# Patient Record
Sex: MALE | Race: BLACK OR AFRICAN AMERICAN | NOT HISPANIC OR LATINO
[De-identification: names, ages, dates, MRNs, and addresses within clinical notes are randomized per-mention and may not be internally consistent; named-entity substitution may affect disease eponyms.]

---

## 2020-01-30 ENCOUNTER — APPOINTMENT (OUTPATIENT)
Dept: CARDIOLOGY | Facility: CLINIC | Age: 50
End: 2020-01-30

## 2020-06-04 ENCOUNTER — OUTPATIENT (OUTPATIENT)
Dept: OUTPATIENT SERVICES | Facility: HOSPITAL | Age: 50
LOS: 1 days | Discharge: HOME | End: 2020-06-04
Payer: MEDICAID

## 2020-06-04 ENCOUNTER — RESULT REVIEW (OUTPATIENT)
Age: 50
End: 2020-06-04

## 2020-06-04 DIAGNOSIS — N18.2 CHRONIC KIDNEY DISEASE, STAGE 2 (MILD): ICD-10-CM

## 2020-06-04 PROBLEM — Z00.00 ENCOUNTER FOR PREVENTIVE HEALTH EXAMINATION: Noted: 2020-06-04

## 2020-06-04 PROCEDURE — 76770 US EXAM ABDO BACK WALL COMP: CPT | Mod: 26

## 2020-06-25 ENCOUNTER — LABORATORY RESULT (OUTPATIENT)
Age: 50
End: 2020-06-25

## 2020-06-26 ENCOUNTER — APPOINTMENT (OUTPATIENT)
Dept: UROLOGY | Facility: CLINIC | Age: 50
End: 2020-06-26
Payer: MEDICAID

## 2020-06-26 VITALS
SYSTOLIC BLOOD PRESSURE: 130 MMHG | TEMPERATURE: 98.8 F | WEIGHT: 199.4 LBS | BODY MASS INDEX: 31.3 KG/M2 | HEART RATE: 90 BPM | HEIGHT: 67 IN | DIASTOLIC BLOOD PRESSURE: 81 MMHG

## 2020-06-26 DIAGNOSIS — Z78.9 OTHER SPECIFIED HEALTH STATUS: ICD-10-CM

## 2020-06-26 DIAGNOSIS — Z81.8 FAMILY HISTORY OF OTHER MENTAL AND BEHAVIORAL DISORDERS: ICD-10-CM

## 2020-06-26 PROCEDURE — 99203 OFFICE O/P NEW LOW 30 MIN: CPT

## 2020-06-26 RX ORDER — AMLODIPINE BESYLATE 5 MG/1
5 TABLET ORAL
Refills: 0 | Status: ACTIVE | COMMUNITY

## 2020-06-26 RX ORDER — EMTRICITABINE, RILPIVIRINE HYDROCHLORIDE, AND TENOFOVIR ALAFENAMIDE 200; 25; 25 MG/1; MG/1; MG/1
200-25-25 TABLET ORAL
Refills: 0 | Status: ACTIVE | COMMUNITY

## 2020-06-26 RX ORDER — ATORVASTATIN CALCIUM 20 MG/1
20 TABLET, FILM COATED ORAL
Refills: 0 | Status: ACTIVE | COMMUNITY

## 2020-06-26 NOTE — LETTER HEADER
[FreeTextEntry3] : Katarina Fair M.D.\par Director of Urology\par [facility]\par [address] [place] [address], [address]\[place] [address]

## 2020-06-26 NOTE — PHYSICAL EXAM
[General Appearance - Well Developed] : well developed [Normal Appearance] : normal appearance [General Appearance - Well Nourished] : well nourished [General Appearance - In No Acute Distress] : no acute distress [Well Groomed] : well groomed [Heart Rate And Rhythm] : Heart rate and rhythm were normal [Respiration, Rhythm And Depth] : normal respiratory rhythm and effort [Exaggerated Use Of Accessory Muscles For Inspiration] : no accessory muscle use [Auscultation Breath Sounds / Voice Sounds] : lungs were clear to auscultation bilaterally [Abdomen Soft] : soft [Abdomen Tenderness] : non-tender [Abdomen Hernia] : no hernia was discovered [Costovertebral Angle Tenderness] : no ~M costovertebral angle tenderness [Urethral Meatus] : meatus normal [Penis Abnormality] : normal circumcised penis [Scrotum] : the scrotum was normal [Testes Mass (___cm)] : there were no testicular masses [Epididymis] : the epididymides were normal [Testes Tenderness] : no tenderness of the testes [Anus Abnormality] : the anus and perineum were normal [Prostate Enlargement] : the prostate was not enlarged [Rectal Exam - Rectum] : digital rectal exam was normal [Prostate Tenderness] : the prostate was not tender [Prostate Size ___ gm] : prostate size [unfilled] gm [Normal Station and Gait] : the gait and station were normal for the patient's age [FreeTextEntry1] : DTR's & BC reflexes were intact  [No Focal Deficits] : no focal deficits [] : no rash [Mood] : the mood was normal [Affect] : the affect was normal [Oriented To Time, Place, And Person] : oriented to person, place, and time [Not Anxious] : not anxious

## 2020-06-26 NOTE — HISTORY OF PRESENT ILLNESS
[FreeTextEntry1] : MR. Downey is a 50-year-old male who wasn’t sure why he was here other than he has moderate voiding symptoms with elevated creatinine. However when I looked through the papers I found that he also has micro hematuria. He told me that he has had this in the past three years ago he so a Dr. Brewer (he thinks that was the name) in [place] who did an entire workup including a CT urogram and cystoscopy and found nothing. He does not have the will try get me the record. His creatinine was not be going up laboratory studies show micro hematuria he has voiding dysfunction with a slow stream, straining, urgency, frequency incomplete emptying so the nephrologist thought he should see a urologist that is why he is here. He said the voiding symptoms for several years does not know what it might be due to.\par \par Please note he has been HIV-positive for many years does not know where he got up but he is bisexual and acknowledges that he did not always use appropriate protection. His PSA on November 22, 2019 was 1.1, the urinalysis from that day showed 11 to 30 red cells and he has had other studies with similar findings.\par For his voiding issues the other urologist gave him Flomax, that resulted in a lack ejaculation he stopped using it. He was put on oxybutynin help the urge but since then has found his ability to avoid compromised and his feeling of incomplete emptying has gotten worse. Question is what can we do for him.\par  [Urinary Urgency] : urinary urgency [Urinary Frequency] : urinary frequency [Nocturia] : nocturia [Weak Stream] : weak stream [Straining] : straining [Dysuria] : no dysuria [Hematuria - Gross] : no gross hematuria [Hematuria - Microscopic] : no microscopic hematuria

## 2020-06-26 NOTE — ASSESSMENT
[FreeTextEntry1] : His physical exam showed a relatively small prostate with normal BC reflex there is no signs of a hernia and his testicles are on the smaller and softer side.\par \par We’re going to get urine to make sure there’s no signs of infection or cancer, he will get me the records from the previous Dr. so we do not reinvent the wheel, he will keep a record of his intake and output and when he comes in we will review the issues and consider a flow study.\par

## 2020-06-28 LAB
APPEARANCE: CLEAR
BACTERIA UR CULT: NORMAL
BILIRUBIN URINE: NEGATIVE
BLOOD URINE: NEGATIVE
COLOR: ABNORMAL
GLUCOSE QUALITATIVE U: NEGATIVE
KETONES URINE: NEGATIVE
LEUKOCYTE ESTERASE URINE: NEGATIVE
NITRITE URINE: NEGATIVE
PH URINE: 6
PROTEIN URINE: NORMAL
SPECIFIC GRAVITY URINE: 1.03
UROBILINOGEN URINE: NORMAL

## 2020-06-29 LAB — URINE CYTOLOGY: NORMAL

## 2020-07-27 ENCOUNTER — OUTPATIENT (OUTPATIENT)
Dept: OUTPATIENT SERVICES | Facility: HOSPITAL | Age: 50
LOS: 1 days | Discharge: HOME | End: 2020-07-27
Payer: COMMERCIAL

## 2020-07-27 PROCEDURE — 92134 CPTRZ OPH DX IMG PST SGM RTA: CPT | Mod: 26

## 2020-07-27 PROCEDURE — 92202 OPSCPY EXTND ON/MAC DRAW: CPT

## 2020-07-27 PROCEDURE — 92004 COMPRE OPH EXAM NEW PT 1/>: CPT

## 2020-08-13 ENCOUNTER — APPOINTMENT (OUTPATIENT)
Dept: UROLOGY | Facility: CLINIC | Age: 50
End: 2020-08-13
Payer: MEDICAID

## 2020-08-13 VITALS — HEIGHT: 67 IN | TEMPERATURE: 97.7 F | BODY MASS INDEX: 30.61 KG/M2 | WEIGHT: 195 LBS

## 2020-08-13 DIAGNOSIS — R79.89 OTHER SPECIFIED ABNORMAL FINDINGS OF BLOOD CHEMISTRY: ICD-10-CM

## 2020-08-13 PROCEDURE — 99213 OFFICE O/P EST LOW 20 MIN: CPT | Mod: 25

## 2020-08-13 PROCEDURE — 51741 ELECTRO-UROFLOWMETRY FIRST: CPT

## 2020-08-13 PROCEDURE — 51798 US URINE CAPACITY MEASURE: CPT

## 2020-08-13 RX ORDER — OXYBUTYNIN CHLORIDE 2.5 MG/1
TABLET ORAL
Refills: 0 | Status: COMPLETED | COMMUNITY
End: 2020-08-13

## 2020-08-13 NOTE — LETTER BODY
[Dear  ___] : Dear  [unfilled], [Please see my note below.] : Please see my note below. [Sincerely,] : Sincerely, [Courtesy Letter:] : I had the pleasure of seeing your patient, [unfilled], in my office today. [FreeTextEntry2] : Meli Wharton MD\par [address]\[place] [address]

## 2020-08-13 NOTE — LETTER HEADER
[FreeTextEntry3] : Katarina Fair M.D.\par Director of Urology\par [facility]\par [address] [place] [address], [address]\[place] [address]

## 2020-08-13 NOTE — PHYSICAL EXAM
[General Appearance - Well Developed] : well developed [General Appearance - Well Nourished] : well nourished [Normal Appearance] : normal appearance [Well Groomed] : well groomed [General Appearance - In No Acute Distress] : no acute distress [Respiration, Rhythm And Depth] : normal respiratory rhythm and effort [] : no respiratory distress [Edema] : no peripheral edema [Exaggerated Use Of Accessory Muscles For Inspiration] : no accessory muscle use [Mood] : the mood was normal [Affect] : the affect was normal [Oriented To Time, Place, And Person] : oriented to person, place, and time [Not Anxious] : not anxious [No Focal Deficits] : no focal deficits [Normal Station and Gait] : the gait and station were normal for the patient's age

## 2020-08-13 NOTE — ASSESSMENT
[FreeTextEntry1] : His uroflow study shows staccato voiding pattern with pushing/straining to void with elevated postvoid residual 166 cc. He is currently off of oxybutynin as well as tamsulosin/finasteride. His ultrasound showed a 34 g prostate. \par He wanted to know if he can restart the oxybutynin as the note said he has no glaucoma but given his residual in the amount of Valsalva I don't think paralyzing his bladder further at this point until we have some greater degree of diagnosis is a good idea.\par \par We discussed the options and is electing to begin alfuzosin p.o. q. daily. This is an alpha blocker some of the Flomax but should not cause absence of ejaculation. I did caution him that it can cause orthostatic hypotension and he is 50 years old. When he gets out of bed he will need to wait a minute or so to make sure he's not getting dizzy and if it becomes too much of a problem he will have to stop. The usage, dosage, mechanism of action, and adverse events were reviewed. He will keep a voiding diary before he starts This new medication and Repeat the record 6 weeks after he Starts it and then followup for Review and a possible Repeat uroflow study.\par \par Concerning his microscopic hematuria, he had a full workup and 2017, which was negative. We'll continue to keep an eye on this and he understands he may need further intervention in the future.

## 2020-08-13 NOTE — HISTORY OF PRESENT ILLNESS
[Urinary Frequency] : urinary frequency [Urinary Urgency] : urinary urgency [Nocturia] : nocturia [Straining] : straining [Weak Stream] : weak stream [FreeTextEntry1] : Ivonne is a 50-year-old male, with history of HIV, who we have been following for bothersome lower urinary tract symptoms and microscopic hematuria.\par \par He Told us last time that in the past,  in late 2017, he underwent a complete microscopic hematuria workup, including CT scan and cystoscopy. CT scan was negative a cystoscopy was unremarkable as per prior medical records.\par \par Additionally at that time he had possible urinary tract symptoms and was treated with tamsulosin and finasteride. He Did not continue tamsulosin secondary to retrograde ejaculation and discontinue finasteride sometime in the past. He remained on oxybutynin until his last visit where he discontinued As we found out he had not been tested for narrow angle glaucoma ever and we need to make sure he did not have that before continuing to take anticholinergic medication..\par \par He presents at to Review his UA C&S, He was supposed to bring in a record of his intake and output and did not, and renal/bladder ultrasound And depending on the results we would consider a flow study [Dysuria] : no dysuria [Hematuria - Gross] : no gross hematuria [Hematuria - Microscopic] : no microscopic hematuria

## 2020-10-15 ENCOUNTER — APPOINTMENT (OUTPATIENT)
Dept: UROLOGY | Facility: CLINIC | Age: 50
End: 2020-10-15

## 2020-12-03 ENCOUNTER — APPOINTMENT (OUTPATIENT)
Dept: UROLOGY | Facility: CLINIC | Age: 50
End: 2020-12-03
Payer: MEDICAID

## 2020-12-03 VITALS
BODY MASS INDEX: 32.02 KG/M2 | TEMPERATURE: 98 F | SYSTOLIC BLOOD PRESSURE: 133 MMHG | WEIGHT: 204 LBS | HEART RATE: 81 BPM | HEIGHT: 67 IN | DIASTOLIC BLOOD PRESSURE: 88 MMHG

## 2020-12-03 PROCEDURE — 99213 OFFICE O/P EST LOW 20 MIN: CPT

## 2020-12-03 PROCEDURE — 51741 ELECTRO-UROFLOWMETRY FIRST: CPT

## 2020-12-03 PROCEDURE — 51798 US URINE CAPACITY MEASURE: CPT

## 2020-12-03 PROCEDURE — 99072 ADDL SUPL MATRL&STAF TM PHE: CPT

## 2020-12-03 NOTE — LETTER HEADER
[FreeTextEntry3] : Katarina Fair M.D.\par Director of Urology\par [facility]\par [address] [place] [address], [address]\[place] [address]

## 2020-12-03 NOTE — HISTORY OF PRESENT ILLNESS
[Urinary Urgency] : urinary urgency [Urinary Frequency] : urinary frequency [Nocturia] : nocturia [Straining] : straining [Weak Stream] : weak stream [FreeTextEntry1] : Ivonne is a 50-year-old male, with history of HIV, who we have been following for bothersome lower urinary tract symptoms and microscopic hematuria.\par \par At his last visit he had discontinued tamsulosin secondary to bothersome retrograde ejaculation and we started him instead on alfuzosin p.o. q. daily.\par \par He reports some subjective improvement however, he it is still experiencing small-volume voiding with frequency and some urgency.\par \par He presents today to review his voiding diary and possibly get a repeat uroflow study on the Uroxatral.\par  [Dysuria] : no dysuria [Hematuria - Gross] : no gross hematuria [Hematuria - Microscopic] : no microscopic hematuria

## 2020-12-03 NOTE — LETTER BODY
[Dear  ___] : Dear  [unfilled], [Courtesy Letter:] : I had the pleasure of seeing your patient, [unfilled], in my office today. [Please see my note below.] : Please see my note below. [Sincerely,] : Sincerely, [FreeTextEntry2] : Meli Wharton MD\par [address]\[place] [address]

## 2020-12-03 NOTE — ASSESSMENT
[FreeTextEntry1] : We reviewed his voiding diary, uroflow study, and symptom index. He still having breakthrough symptoms. His uroflow study, on alfuzosin, is not much different than his prior uroflow study off of medication. It seems he may be having some irritative voiding. We'll have him cleared by ophthalmology to start anticholinergic therapy. He told us the doctor said they sent it in but we never got it. Once he starts the medication he will then keep a voiding diary in 6 weeks And followup after for Review and a possible repeat uroflow. If this is not diagnostic we may need formal urodynamics

## 2020-12-10 RX ORDER — OXYBUTYNIN CHLORIDE 5 MG/1
5 TABLET, EXTENDED RELEASE ORAL
Qty: 30 | Refills: 3 | Status: DISCONTINUED | COMMUNITY
Start: 2020-12-10 | End: 2020-12-10

## 2021-01-22 ENCOUNTER — APPOINTMENT (OUTPATIENT)
Dept: UROLOGY | Facility: CLINIC | Age: 51
End: 2021-01-22

## 2021-03-05 ENCOUNTER — APPOINTMENT (OUTPATIENT)
Dept: GASTROENTEROLOGY | Facility: CLINIC | Age: 51
End: 2021-03-05
Payer: MEDICAID

## 2021-03-05 ENCOUNTER — OUTPATIENT (OUTPATIENT)
Dept: OUTPATIENT SERVICES | Facility: HOSPITAL | Age: 51
LOS: 1 days | Discharge: HOME | End: 2021-03-05

## 2021-03-05 ENCOUNTER — OUTPATIENT (OUTPATIENT)
Dept: OUTPATIENT SERVICES | Facility: HOSPITAL | Age: 51
LOS: 1 days | Discharge: HOME | End: 2021-03-05
Payer: MEDICAID

## 2021-03-05 VITALS
DIASTOLIC BLOOD PRESSURE: 87 MMHG | HEART RATE: 77 BPM | BODY MASS INDEX: 30.61 KG/M2 | TEMPERATURE: 97.2 F | OXYGEN SATURATION: 96 % | HEIGHT: 67 IN | WEIGHT: 195 LBS | SYSTOLIC BLOOD PRESSURE: 132 MMHG

## 2021-03-05 DIAGNOSIS — I10 ESSENTIAL (PRIMARY) HYPERTENSION: ICD-10-CM

## 2021-03-05 DIAGNOSIS — Z00.00 ENCOUNTER FOR GENERAL ADULT MEDICAL EXAMINATION W/OUT ABNORMAL FINDINGS: ICD-10-CM

## 2021-03-05 DIAGNOSIS — Z21 ASYMPTOMATIC HUMAN IMMUNODEFICIENCY VIRUS [HIV] INFECTION STATUS: ICD-10-CM

## 2021-03-05 DIAGNOSIS — R05 COUGH: ICD-10-CM

## 2021-03-05 PROCEDURE — 99203 OFFICE O/P NEW LOW 30 MIN: CPT

## 2021-03-05 PROCEDURE — 71046 X-RAY EXAM CHEST 2 VIEWS: CPT | Mod: 26

## 2021-03-05 NOTE — PHYSICAL EXAM
[General Appearance - Alert] : alert [General Appearance - In No Acute Distress] : in no acute distress [General Appearance - Well Nourished] : well nourished [General Appearance - Well Developed] : well developed [Sclera] : the sclera and conjunctiva were normal [PERRL With Normal Accommodation] : pupils were equal in size, round, and reactive to light [Extraocular Movements] : extraocular movements were intact [Outer Ear] : the ears and nose were normal in appearance [Neck Appearance] : the appearance of the neck was normal [Respiration, Rhythm And Depth] : normal respiratory rhythm and effort [Exaggerated Use Of Accessory Muscles For Inspiration] : no accessory muscle use [Auscultation Breath Sounds / Voice Sounds] : lungs were clear to auscultation bilaterally [Chest Palpation] : palpation of the chest revealed no abnormalities [Lungs Percussion] : the lungs were normal to percussion [Apical Impulse] : the apical impulse was normal [Heart Sounds] : normal S1 and S2 [Heart Sounds Gallop] : no gallops [Murmurs] : no murmurs [Bowel Sounds] : normal bowel sounds [Abdomen Soft] : soft [Abdomen Tenderness] : non-tender [] : no hepato-splenomegaly [Abdomen Mass (___ Cm)] : no abdominal mass palpated [Abdomen Hernia] : no hernia was discovered [Abnormal Walk] : normal gait [Skin Color & Pigmentation] : normal skin color and pigmentation

## 2021-03-05 NOTE — REVIEW OF SYSTEMS
[Recent Weight Gain (___ Lbs)] : recent [unfilled] ~Ulb weight gain [Fever] : no fever [Chills] : no chills [Feeling Poorly] : not feeling poorly [Feeling Tired] : not feeling tired [Eye Pain] : no eye pain [Red Eyes] : eyes not red [Earache] : no earache [Heart Rate Is Slow] : the heart rate was not slow [Heart Rate Is Fast] : the heart rate was not fast [Chest Pain] : no chest pain [Palpitations] : no palpitations [Leg Claudication] : no intermittent leg claudication [Lower Ext Edema] : no extremity edema [Dysuria] : no dysuria [Incontinence] : no incontinence [Hesitancy] : no urinary hesitancy [Nocturia] : no nocturia [Arthralgias] : no arthralgias [Joint Pain] : no joint pain [Skin Lesions] : no skin lesions [Confused] : no confusion [Convulsions] : no convulsions [Easy Bleeding] : no tendency for easy bleeding

## 2021-03-05 NOTE — HISTORY OF PRESENT ILLNESS
[None] : no changes [Heartburn] : denies heartburn [Nausea] : denies nausea [Vomiting] : denies vomiting [Diarrhea] : denies diarrhea [Constipation] : denies constipation [Yellow Skin Or Eyes (Jaundice)] : denies jaundice [Abdominal Pain] : denies abdominal pain [Abdominal Swelling] : denies abdominal swelling [de-identified] : 50 year old male patient (ex-smoker: quit in 1995, used to smoke few cigarettes per day) with\par - History of HIV maintained on Odefsey (Emtricitabine 200, Rilpivirine 25, Tenofovir 25mg)\par - HTN on Amlor 5mg QD and Benozepril 10mg QD\par - DL on Atorvastatin 20mg QD\par - History of retrograde ejaculation and BPH with urine cytology negative for malignant cells recently switched to alfuzosin 10mg QD\par - No family history of colorectal or GI malignancies\par \par Patient is referred to us on 03/05/2021 for screening colonoscopy.\par Patient reports adequate appetite and denies abdominal pain, nausea, or vomiting. Bowel movements alternate between loose and soft sometimes occuring more than once in one day and sometimes occurring every few days. He reports a small increase in weight recently. On ROS, he denies any fever, chills, URTI symptoms. ROS only positive for urinary frequency and urgency in absence of gross hematuria.  [FreeTextEntry1] : 50 year old male patient (ex-smoker: quit in 1995, used to smoke few cigarettes per day) with\par - History of HIV maintained on Odefsey (Emtricitabine 200, Rilpivirine 25, Tenofovir 25mg)\par - HTN on Amlor 5mg QD and Benozepril 10mg QD\par - DL on Atorvastatin 20mg QD\par - History of retrograde ejaculation and BPH with urine cytology negative for malignant cells recently switched to alfuzosin 10mg QD\par - No family history of colorectal or GI malignancies\par \par Patient is referred to us on 03/05/2021 for screening colonoscopy.\par Patient reports adequate appetite and denies abdominal pain, nausea, or vomiting. Bowel movements alternate between loose and soft sometimes occuring more than once in one day and sometimes occurring every few days. He reports a small increase in weight recently. On ROS, he denies any fever, chills, URTI symptoms. ROS only positive for urinary frequency and urgency in absence of gross hematuria.

## 2021-03-05 NOTE — ASSESSMENT
[FreeTextEntry1] : Case of a 50 year old male patient ex-smoker with History of HIV maintained on Odefsey (Emtricitabine 200, Rilpivirine 25, Tenofovir 25mg) presenting for screening colonoscopy.\par \par \par Colorectal Screening\par * Age 50 years\par - Will schedule colonoscopy \par - Will order miralax to be mixed with 2 quartz of Gaterade and to be taken one day prior to procedure from 16:00 PM to midnight\par

## 2021-03-08 LAB
ALBUMIN SERPL ELPH-MCNC: 5 G/DL
ALP BLD-CCNC: 66 U/L
ALT SERPL-CCNC: 30 U/L
ANION GAP SERPL CALC-SCNC: 12 MMOL/L
AST SERPL-CCNC: 33 U/L
BASOPHILS # BLD AUTO: 0.08 K/UL
BASOPHILS NFR BLD AUTO: 1.3 %
BILIRUB SERPL-MCNC: 0.9 MG/DL
BUN SERPL-MCNC: 11 MG/DL
CALCIUM SERPL-MCNC: 9.3 MG/DL
CHLORIDE SERPL-SCNC: 102 MMOL/L
CO2 SERPL-SCNC: 25 MMOL/L
CREAT SERPL-MCNC: 1.2 MG/DL
EOSINOPHIL # BLD AUTO: 0.04 K/UL
EOSINOPHIL NFR BLD AUTO: 0.6 %
GLUCOSE SERPL-MCNC: 88 MG/DL
HCT VFR BLD CALC: 43 %
HGB BLD-MCNC: 12.8 G/DL
IMM GRANULOCYTES NFR BLD AUTO: 0.2 %
LYMPHOCYTES # BLD AUTO: 2.51 K/UL
LYMPHOCYTES NFR BLD AUTO: 40.7 %
MAN DIFF?: NORMAL
MCHC RBC-ENTMCNC: 22.8 PG
MCHC RBC-ENTMCNC: 29.8 G/DL
MCV RBC AUTO: 76.5 FL
MONOCYTES # BLD AUTO: 0.67 K/UL
MONOCYTES NFR BLD AUTO: 10.9 %
NEUTROPHILS # BLD AUTO: 2.85 K/UL
NEUTROPHILS NFR BLD AUTO: 46.3 %
PLATELET # BLD AUTO: 253 K/UL
POTASSIUM SERPL-SCNC: 4.3 MMOL/L
PROT SERPL-MCNC: 8.2 G/DL
RBC # BLD: 5.62 M/UL
RBC # FLD: 13.9 %
SODIUM SERPL-SCNC: 139 MMOL/L
WBC # FLD AUTO: 6.16 K/UL

## 2021-03-21 ENCOUNTER — OUTPATIENT (OUTPATIENT)
Dept: OUTPATIENT SERVICES | Facility: HOSPITAL | Age: 51
LOS: 1 days | Discharge: HOME | End: 2021-03-21

## 2021-03-21 ENCOUNTER — LABORATORY RESULT (OUTPATIENT)
Age: 51
End: 2021-03-21

## 2021-03-21 DIAGNOSIS — Z11.59 ENCOUNTER FOR SCREENING FOR OTHER VIRAL DISEASES: ICD-10-CM

## 2021-03-24 ENCOUNTER — TRANSCRIPTION ENCOUNTER (OUTPATIENT)
Age: 51
End: 2021-03-24

## 2021-03-24 ENCOUNTER — OUTPATIENT (OUTPATIENT)
Dept: OUTPATIENT SERVICES | Facility: HOSPITAL | Age: 51
LOS: 1 days | Discharge: HOME | End: 2021-03-24
Payer: MEDICAID

## 2021-03-24 ENCOUNTER — RESULT REVIEW (OUTPATIENT)
Age: 51
End: 2021-03-24

## 2021-03-24 VITALS
HEART RATE: 74 BPM | SYSTOLIC BLOOD PRESSURE: 118 MMHG | OXYGEN SATURATION: 99 % | RESPIRATION RATE: 18 BRPM | DIASTOLIC BLOOD PRESSURE: 70 MMHG

## 2021-03-24 VITALS
TEMPERATURE: 98 F | RESPIRATION RATE: 18 BRPM | WEIGHT: 195.11 LBS | HEIGHT: 67 IN | HEART RATE: 88 BPM | DIASTOLIC BLOOD PRESSURE: 91 MMHG | SYSTOLIC BLOOD PRESSURE: 121 MMHG

## 2021-03-24 DIAGNOSIS — Z98.890 OTHER SPECIFIED POSTPROCEDURAL STATES: Chronic | ICD-10-CM

## 2021-03-24 PROCEDURE — 88341 IMHCHEM/IMCYTCHM EA ADD ANTB: CPT | Mod: 26

## 2021-03-24 PROCEDURE — 43239 EGD BIOPSY SINGLE/MULTIPLE: CPT | Mod: XS

## 2021-03-24 PROCEDURE — 45378 DIAGNOSTIC COLONOSCOPY: CPT

## 2021-03-24 PROCEDURE — 88305 TISSUE EXAM BY PATHOLOGIST: CPT | Mod: 26

## 2021-03-24 PROCEDURE — 88312 SPECIAL STAINS GROUP 1: CPT | Mod: 26

## 2021-03-24 PROCEDURE — 88342 IMHCHEM/IMCYTCHM 1ST ANTB: CPT | Mod: 26

## 2021-03-24 RX ORDER — ASPIRIN/CALCIUM CARB/MAGNESIUM 324 MG
1 TABLET ORAL
Qty: 0 | Refills: 0 | DISCHARGE

## 2021-03-24 RX ORDER — ATORVASTATIN CALCIUM 80 MG/1
1 TABLET, FILM COATED ORAL
Qty: 0 | Refills: 0 | DISCHARGE

## 2021-03-24 RX ORDER — AMLODIPINE BESYLATE 2.5 MG/1
1 TABLET ORAL
Qty: 0 | Refills: 0 | DISCHARGE

## 2021-03-24 NOTE — H&P PST ADULT - HISTORY OF PRESENT ILLNESS
51 year male patient is here for EGD and screening colonoscopy. Patient with history of anemia and abdominal discomfort

## 2021-03-24 NOTE — CHART NOTE - NSCHARTNOTEFT_GEN_A_CORE
PACU ANESTHESIA ADMISSION NOTE      Procedure:   Post op diagnosis:      ____  Intubated  TV:______       Rate: ______      FiO2: ______    __x__  Patent Airway    __x__  Full return of protective reflexes    __x__  Full recovery from anesthesia / back to baseline status    Vitals  HR: 91  BP: 104/1563  RR: 15  O2 Sat: 100  Temp: na    Mental Status:  __x__ Awake   ___x__ Alert   _____ Drowsy   _____ Sedated    Nausea/Vomiting:  __x__ NO  ______Yes,   See Post - Op Orders          Pain Scale (0-10):  _____    Treatment: ____ None    __x__ See Post - Op/PCA Orders    Post - Operative Fluids:   ____ Oral   __x__ See Post - Op Orders    Plan: Discharge when criteria met:   _x___Home       _____Floor     _____Critical Care   Other:_________________    Comments: Patient had smooth intraoperative event, no anesthesia complication.

## 2021-03-24 NOTE — PRE-ANESTHESIA EVALUATION ADULT - NSANTHADDINFOFT_GEN_ALL_CORE
Procedure/Risks explained for moderate/deep sedation + routine monitoring. Patient understands the stated anesthetic plan and agrees to proceed.

## 2021-03-30 LAB — SURGICAL PATHOLOGY STUDY: SIGNIFICANT CHANGE UP

## 2021-04-01 DIAGNOSIS — Z79.82 LONG TERM (CURRENT) USE OF ASPIRIN: ICD-10-CM

## 2021-04-01 DIAGNOSIS — Z12.11 ENCOUNTER FOR SCREENING FOR MALIGNANT NEOPLASM OF COLON: ICD-10-CM

## 2021-04-01 DIAGNOSIS — E78.00 PURE HYPERCHOLESTEROLEMIA, UNSPECIFIED: ICD-10-CM

## 2021-04-01 DIAGNOSIS — B96.81 HELICOBACTER PYLORI [H. PYLORI] AS THE CAUSE OF DISEASES CLASSIFIED ELSEWHERE: ICD-10-CM

## 2021-04-01 DIAGNOSIS — K29.50 UNSPECIFIED CHRONIC GASTRITIS WITHOUT BLEEDING: ICD-10-CM

## 2021-04-01 DIAGNOSIS — Z88.3 ALLERGY STATUS TO OTHER ANTI-INFECTIVE AGENTS: ICD-10-CM

## 2021-04-01 DIAGNOSIS — I10 ESSENTIAL (PRIMARY) HYPERTENSION: ICD-10-CM

## 2021-04-09 ENCOUNTER — APPOINTMENT (OUTPATIENT)
Dept: UROLOGY | Facility: CLINIC | Age: 51
End: 2021-04-09
Payer: MEDICAID

## 2021-04-09 VITALS
BODY MASS INDEX: 30.76 KG/M2 | DIASTOLIC BLOOD PRESSURE: 92 MMHG | SYSTOLIC BLOOD PRESSURE: 145 MMHG | TEMPERATURE: 97.7 F | HEART RATE: 87 BPM | HEIGHT: 67 IN | WEIGHT: 196 LBS

## 2021-04-09 PROBLEM — I10 ESSENTIAL (PRIMARY) HYPERTENSION: Chronic | Status: ACTIVE | Noted: 2021-03-24

## 2021-04-09 PROBLEM — E78.00 PURE HYPERCHOLESTEROLEMIA, UNSPECIFIED: Chronic | Status: ACTIVE | Noted: 2021-03-24

## 2021-04-09 PROCEDURE — 99214 OFFICE O/P EST MOD 30 MIN: CPT | Mod: 25

## 2021-04-09 PROCEDURE — 51798 US URINE CAPACITY MEASURE: CPT

## 2021-04-09 PROCEDURE — 51741 ELECTRO-UROFLOWMETRY FIRST: CPT

## 2021-04-09 PROCEDURE — 99072 ADDL SUPL MATRL&STAF TM PHE: CPT

## 2021-04-09 RX ORDER — ALFUZOSIN HYDROCHLORIDE 10 MG/1
10 TABLET, EXTENDED RELEASE ORAL DAILY
Qty: 30 | Refills: 1 | Status: COMPLETED | COMMUNITY
Start: 2020-08-13 | End: 2021-04-09

## 2021-04-09 NOTE — ASSESSMENT
[FreeTextEntry1] : He is not satisfied with the way his voiding even on anticholinergics.  The question is does he want something done what he rather live like this.  In order to determine if there is something we can do, as this could be bladder, prostate or sphincteric in origin and I explained the concept of dyssynergia to him and how the different causes of bladder outlet obstruction would be treated differently, we will need to do a diagnostic study called urodynamics.\par It is not a high risk study but it is somewhat uncomfortable.\par \par  All options were reviewed and he is electing to undergo urodynamic testing.  All aspects of urodynamic testing were reviewed in detail with regard to preparation, outcomes, and adverse events.  Will obtain urine culture 1 week prior to study.  He will discontinue anticholinergic therapy leading up to the study.

## 2021-04-09 NOTE — LETTER HEADER
[FreeTextEntry3] : Katarina Fair M.D.\par Director of Urology\par [facility]\par [address] [place] [address], [address]\[place] [address]

## 2021-04-09 NOTE — LETTER BODY
[Dear  ___] : Dear  [unfilled], [Courtesy Letter:] : I had the pleasure of seeing your patient, [unfilled], in my office today. [Please see my note below.] : Please see my note below. [Sincerely,] : Sincerely, [FreeTextEntry2] : Meli Wharton MD\par [address]\[place] [address]

## 2021-04-09 NOTE — HISTORY OF PRESENT ILLNESS
[Urinary Urgency] : urinary urgency [Urinary Frequency] : urinary frequency [Nocturia] : nocturia [Straining] : straining [Weak Stream] : weak stream [FreeTextEntry1] : Ivonne is a 50-year-old male, with history of HIV, who we have been following for bothersome lower urinary tract symptoms and microscopic hematuria.\par \par At his last visit he had discontinued alfuzosin as he felt there was no significant change in his voiding.\par \par After we repeated uroflow study I recommended starting trospium.  He reports some improvement in his voiding however, he is still having breakthrough symptoms with small volume voiding and urinary urgency.\par \par He presents today to review his response and to consider a uroflow study on medication\par \par He does not perceive a major benefit and his symptoms are still quite bothersome we proceeded to a flow study please see that note listed separately\par  [Dysuria] : no dysuria [Hematuria - Gross] : no gross hematuria [Hematuria - Microscopic] : no microscopic hematuria

## 2021-05-07 ENCOUNTER — LABORATORY RESULT (OUTPATIENT)
Age: 51
End: 2021-05-07

## 2021-05-10 ENCOUNTER — NON-APPOINTMENT (OUTPATIENT)
Age: 51
End: 2021-05-10

## 2021-05-10 LAB — BACTERIA UR CULT: NORMAL

## 2021-05-14 ENCOUNTER — APPOINTMENT (OUTPATIENT)
Dept: UROLOGY | Facility: CLINIC | Age: 51
End: 2021-05-14
Payer: MEDICAID

## 2021-05-14 VITALS
SYSTOLIC BLOOD PRESSURE: 129 MMHG | TEMPERATURE: 98 F | WEIGHT: 196 LBS | BODY MASS INDEX: 30.76 KG/M2 | HEIGHT: 67 IN | HEART RATE: 82 BPM | DIASTOLIC BLOOD PRESSURE: 84 MMHG

## 2021-05-14 LAB
APPEARANCE: CLEAR
BILIRUBIN URINE: NEGATIVE
BLOOD URINE: ABNORMAL
COLOR: YELLOW
GLUCOSE QUALITATIVE U: NEGATIVE
KETONES URINE: NEGATIVE
LEUKOCYTE ESTERASE URINE: NEGATIVE
NITRITE URINE: NEGATIVE
PH URINE: 6.5
PROTEIN URINE: NORMAL
SPECIFIC GRAVITY URINE: 1.02
UROBILINOGEN URINE: NORMAL

## 2021-05-14 PROCEDURE — 51798 US URINE CAPACITY MEASURE: CPT

## 2021-05-14 PROCEDURE — 51741 ELECTRO-UROFLOWMETRY FIRST: CPT

## 2021-05-14 PROCEDURE — 51784 ANAL/URINARY MUSCLE STUDY: CPT

## 2021-05-14 PROCEDURE — 51797 INTRAABDOMINAL PRESSURE TEST: CPT

## 2021-05-14 PROCEDURE — 51728 CYSTOMETROGRAM W/VP: CPT

## 2021-05-14 PROCEDURE — 99072 ADDL SUPL MATRL&STAF TM PHE: CPT

## 2021-06-28 ENCOUNTER — APPOINTMENT (OUTPATIENT)
Dept: UROLOGY | Facility: CLINIC | Age: 51
End: 2021-06-28
Payer: MEDICAID

## 2021-06-28 VITALS
HEART RATE: 79 BPM | SYSTOLIC BLOOD PRESSURE: 125 MMHG | BODY MASS INDEX: 30.61 KG/M2 | TEMPERATURE: 98 F | HEIGHT: 67 IN | WEIGHT: 195 LBS | DIASTOLIC BLOOD PRESSURE: 82 MMHG

## 2021-06-28 DIAGNOSIS — R30.0 DYSURIA: ICD-10-CM

## 2021-06-28 DIAGNOSIS — R31.29 OTHER MICROSCOPIC HEMATURIA: ICD-10-CM

## 2021-06-28 PROCEDURE — 99214 OFFICE O/P EST MOD 30 MIN: CPT | Mod: 25

## 2021-06-28 PROCEDURE — 76872 US TRANSRECTAL: CPT

## 2021-06-28 RX ORDER — POLYETHYLENE GLYCOL 3350 17 G/17G
17 POWDER, FOR SOLUTION ORAL
Qty: 1 | Refills: 0 | Status: COMPLETED | COMMUNITY
Start: 2021-03-05 | End: 2021-06-28

## 2021-06-28 RX ORDER — BENAZEPRIL HYDROCHLORIDE 10 MG/1
10 TABLET, FILM COATED ORAL
Refills: 0 | Status: COMPLETED | COMMUNITY
End: 2021-06-28

## 2021-06-28 RX ORDER — ASPIRIN 81 MG
81 TABLET, DELAYED RELEASE (ENTERIC COATED) ORAL
Refills: 0 | Status: ACTIVE | COMMUNITY

## 2021-06-28 RX ORDER — CEFPODOXIME PROXETIL 200 MG/1
200 TABLET, FILM COATED ORAL
Qty: 6 | Refills: 0 | Status: COMPLETED | COMMUNITY
Start: 2021-05-14 | End: 2021-06-28

## 2021-06-28 NOTE — PHYSICAL EXAM
[General Appearance - Well Developed] : well developed [General Appearance - Well Nourished] : well nourished [Normal Appearance] : normal appearance [Well Groomed] : well groomed [General Appearance - In No Acute Distress] : no acute distress [Abdomen Soft] : soft [Abdomen Tenderness] : non-tender [Abdomen Hernia] : no hernia was discovered [Costovertebral Angle Tenderness] : no ~M costovertebral angle tenderness [Heart Rate And Rhythm] : Heart rate and rhythm were normal [FreeTextEntry1] : mild edema [] : no respiratory distress [Respiration, Rhythm And Depth] : normal respiratory rhythm and effort [Exaggerated Use Of Accessory Muscles For Inspiration] : no accessory muscle use [Oriented To Time, Place, And Person] : oriented to person, place, and time [Affect] : the affect was normal [Mood] : the mood was normal [Not Anxious] : not anxious [Normal Station and Gait] : the gait and station were normal for the patient's age

## 2021-06-28 NOTE — ASSESSMENT
[FreeTextEntry1] : He has a small prostate and would be a candidate for any other transurethral options including UroLift, steam therapy, transurethral incision or transurethral resection.  There are several issues here\par \par 1.,  He had a procedure in the past with the catheter so theoretically there could be a stricture, if he is going for the UroLift Dr. Cardenas will be doing a preprocedure cystoscopy which would tell if there is a stricture is obviously the treatment options with change.\par 2.  He has had microhematuria at the same time if he is doing the pre-UroLift cystoscopy he could take a look at the bladder and see if there is anything there that may be causing blood\par 3.  He is concerned that he may still want children 1 day he is not sure but he would like to preserve the options and he understands that all of the procedures have the ability to compromise ejaculation of sperm though UroLift is probably the least offensive.  Again the least does not mean none and even with the UroLift he can have trouble with ejaculation\par 4.  He understands that UroLift he has not as guaranteed as a transurethral resection or or even an incision but it does not preclude a secondary procedure and if it works well enough it has lower side effects and again possible preservation of antegrade ejaculation \par 5.  Transurethral incision and resection were also discussed and at this point I am recommending he meet with Dr. Cardenas discussed the procedure in greater detail with him and if things work out great if not we can always consider secondary procedures.\par \par Finally he understands that he may or may not be able to get off the trospium once bladder outlet obstruction is relieved.  However even if he can will probably take some time

## 2021-06-28 NOTE — LETTER BODY
[Dear  ___] : Dear  [unfilled], [Courtesy Letter:] : I had the pleasure of seeing your patient, [unfilled], in my office today. [Please see my note below.] : Please see my note below. [Sincerely,] : Sincerely, [FreeTextEntry2] : Meli Wharton MD\par [address]\[place] [address]

## 2021-06-28 NOTE — LETTER HEADER
[FreeTextEntry3] : Katarina Fair M.D.\par Director of Urology\par [facility]\par [address] [place] [address], [address]\[place] [address]

## 2021-06-28 NOTE — HISTORY OF PRESENT ILLNESS
[FreeTextEntry1] : Ivonne is a 51 year old male who underwent urodynamic testing on May 14, 2021 which showed bladder outlet obstruction.  He is on trospium so is no longer having urge incontinence but he is still having trouble voiding.  We went to a transrectal ultrasound today to check prostatic size please see that note listed separately [Urinary Urgency] : urinary urgency [Urinary Frequency] : urinary frequency [Nocturia] : nocturia [Straining] : straining [Weak Stream] : weak stream [Dysuria] : no dysuria [Hematuria - Gross] : no gross hematuria [Hematuria - Microscopic] : no microscopic hematuria

## 2021-06-29 LAB
PSA FREE FLD-MCNC: 49 %
PSA FREE SERPL-MCNC: 0.92 NG/ML
PSA SERPL-MCNC: 1.86 NG/ML

## 2021-07-30 ENCOUNTER — APPOINTMENT (OUTPATIENT)
Dept: UROLOGY | Facility: CLINIC | Age: 51
End: 2021-07-30
Payer: MEDICAID

## 2021-07-30 VITALS — BODY MASS INDEX: 30.61 KG/M2 | WEIGHT: 195 LBS | HEIGHT: 67 IN

## 2021-07-30 PROCEDURE — 99213 OFFICE O/P EST LOW 20 MIN: CPT

## 2021-07-30 NOTE — ASSESSMENT
[FreeTextEntry1] : Ivonne is a 51 year old male who underwent urodynamic testing on May 14, 2021 which showed bladder outlet obstruction. He is on trospium so is no longer having urge incontinence but he is still having trouble voiding. We went to a transrectal ultrasound -- 17g\par \par may 2021\par qmax 5.6ml/s\par pvr 1ml\par urodynamics showed COHEN

## 2021-09-03 ENCOUNTER — APPOINTMENT (OUTPATIENT)
Dept: UROLOGY | Facility: CLINIC | Age: 51
End: 2021-09-03
Payer: MEDICAID

## 2021-09-03 PROCEDURE — 52000 CYSTOURETHROSCOPY: CPT

## 2021-09-03 PROCEDURE — 99213 OFFICE O/P EST LOW 20 MIN: CPT | Mod: 25

## 2021-09-03 NOTE — ASSESSMENT
[FreeTextEntry1] : Ivonne is a 51 year old male who underwent urodynamic testing on May 14, 2021 which showed bladder outlet obstruction. He is on trospium so is no longer having urge incontinence but he is still having trouble voiding. We did a transrectal ultrasound which showed a 17g prostate   may 2021 qmax 5.6ml/s pvr 1ml urodynamics showed COHEN.   cysto today did show moderate bilobar obstruction without median lobe protrusion

## 2021-09-03 NOTE — HISTORY OF PRESENT ILLNESS
[FreeTextEntry1] : \par Ivonne is a 51 year old male who underwent urodynamic testing on May 14, 2021 which showed bladder outlet obstruction. He is on trospium so is no longer having urge incontinence but he is still having trouble voiding. We did a transrectal ultrasound which showed a 17g prostate \par \par may 2021\par qmax 5.6ml/s\par pvr 1ml\par urodynamics showed COHEN. \par \par cysto today did show moderate bilobar obstruction without median lobe protrusion\par

## 2021-09-09 RX ORDER — ACETAMINOPHEN 500 MG/1
500 TABLET ORAL
Qty: 18 | Refills: 0 | Status: ACTIVE | COMMUNITY
Start: 2021-09-09 | End: 1900-01-01

## 2021-09-09 RX ORDER — SULFAMETHOXAZOLE AND TRIMETHOPRIM 800; 160 MG/1; MG/1
800-160 TABLET ORAL
Qty: 6 | Refills: 0 | Status: ACTIVE | COMMUNITY
Start: 2021-09-09 | End: 1900-01-01

## 2021-09-09 RX ORDER — PHENAZOPYRIDINE HYDROCHLORIDE 100 MG/1
100 TABLET ORAL
Qty: 4 | Refills: 0 | Status: ACTIVE | COMMUNITY
Start: 2021-09-09 | End: 1900-01-01

## 2021-10-01 ENCOUNTER — LABORATORY RESULT (OUTPATIENT)
Age: 51
End: 2021-10-01

## 2021-10-05 LAB
APPEARANCE: CLEAR
BACTERIA UR CULT: NORMAL
BILIRUBIN URINE: NEGATIVE
BLOOD URINE: ABNORMAL
COLOR: YELLOW
GLUCOSE QUALITATIVE U: NEGATIVE
KETONES URINE: NEGATIVE
LEUKOCYTE ESTERASE URINE: NEGATIVE
NITRITE URINE: NEGATIVE
PH URINE: 6
PROTEIN URINE: NORMAL
SPECIFIC GRAVITY URINE: 1.02
UROBILINOGEN URINE: NORMAL

## 2021-10-08 ENCOUNTER — APPOINTMENT (OUTPATIENT)
Dept: UROLOGY | Facility: CLINIC | Age: 51
End: 2021-10-08
Payer: MEDICAID

## 2021-10-08 VITALS — WEIGHT: 195 LBS | BODY MASS INDEX: 30.61 KG/M2 | HEIGHT: 67 IN

## 2021-10-08 PROCEDURE — 52441Z: CUSTOM

## 2021-10-08 PROCEDURE — 52442Z: CUSTOM

## 2021-10-11 ENCOUNTER — APPOINTMENT (OUTPATIENT)
Dept: UROLOGY | Facility: CLINIC | Age: 51
End: 2021-10-11
Payer: MEDICAID

## 2021-10-11 PROCEDURE — 99213 OFFICE O/P EST LOW 20 MIN: CPT

## 2021-10-11 NOTE — HISTORY OF PRESENT ILLNESS
[FreeTextEntry1] : 51 year old male 3 day s/p Urolift. \par Patient presents to office for follow up evaluation. Patient reports that he removed Carmona Catheter himself 1 day post procedure. Patient states that he has been urinating with good flow. Reports that he has been urinating frequently. Patient also reports mild dysuria. Patient states that the first day he removed the catheter, he had bloody urine with blood clots. Patient states that the blood has since cleared. \par \par Patient reports feeling well with good energy. Denies fevers. \par \par Bladder scan PVR today is 22 mL.

## 2021-10-11 NOTE — PHYSICAL EXAM
[General Appearance - In No Acute Distress] : no acute distress [Abdomen Soft] : soft [Abdomen Tenderness] : non-tender [Urinary Bladder Findings] : the bladder was normal on palpation [] : no respiratory distress [Oriented To Time, Place, And Person] : oriented to person, place, and time [Normal Station and Gait] : the gait and station were normal for the patient's age [No Focal Deficits] : no focal deficits

## 2021-10-11 NOTE — ASSESSMENT
[FreeTextEntry1] : 51 year old male s/p Urolift. \par Patient is urinating with good flow although urinating frequently. Given procedure 3 days ago, likely from post procedure inflammation. \par Patient instructed to avoid strenuous exercises for 1 month to allow for healing. Patient instructed to drink plenty of water and take stool softeners as needed for constipation. Patient is aware that constipation can lead to worsening or urinary symptoms and could lead to further bleeding. Patient verbalized understanding. \par \par Patient given strict RTO precautions including inability to urinate, and gross hematuria. Patient is aware if he is unable to urinate and the office is closed, patient is to present to Emergency Room. Patient verbalized understanding. \par \par Plan\par -Patient to follow up in 5 weeks for Uroflow, PVR\par -Follow up sooner if needed.

## 2021-11-10 ENCOUNTER — OUTPATIENT (OUTPATIENT)
Dept: OUTPATIENT SERVICES | Facility: HOSPITAL | Age: 51
LOS: 1 days | Discharge: HOME | End: 2021-11-10
Payer: MEDICAID

## 2021-11-10 ENCOUNTER — APPOINTMENT (OUTPATIENT)
Dept: OPHTHALMOLOGY | Facility: CLINIC | Age: 51
End: 2021-11-10

## 2021-11-10 DIAGNOSIS — Z98.890 OTHER SPECIFIED POSTPROCEDURAL STATES: Chronic | ICD-10-CM

## 2021-11-10 PROCEDURE — 99213 OFFICE O/P EST LOW 20 MIN: CPT

## 2021-11-19 ENCOUNTER — APPOINTMENT (OUTPATIENT)
Dept: UROLOGY | Facility: CLINIC | Age: 51
End: 2021-11-19
Payer: MEDICAID

## 2021-11-19 DIAGNOSIS — N40.0 BENIGN PROSTATIC HYPERPLASIA WITHOUT LOWER URINARY TRACT SYMPMS: ICD-10-CM

## 2021-11-19 DIAGNOSIS — R39.198 OTHER DIFFICULTIES WITH MICTURITION: ICD-10-CM

## 2021-11-19 PROCEDURE — 99214 OFFICE O/P EST MOD 30 MIN: CPT

## 2021-11-19 NOTE — ASSESSMENT
[FreeTextEntry1] : 51 year old 1 month s/p Urolift. \par Patient is doing well. Still bothered by Urinary frequency. Denies dysuria and urgency. \par \par Uroflow and PVR was reviewed with patient. \par \par Plan\par -Follow up 2 months (3 month post procedure)\par -Uroflow and PVR at that time.

## 2021-11-19 NOTE — HISTORY OF PRESENT ILLNESS
[FreeTextEntry1] : 51 year old 1 month s/p Urolift. \par Patient presents to office today for Uroflow and PVR. \par Uroflow 11/19/2021 reveals an initial rise to peak flow of 32.5 ml/s with progressive decrease in flow as bladder empties. Average flow rates is 11.1 ml/s. Voided volume 310 mL. PVR is 41 mL. \par \par Patient states that he feels his flow is improved. Patient states he still has urinary frequency. Patient denies urgency, dysuria, and gross hematuria. \par Patient reports that his sexual function is normal. Patient reports normal ejaculation. \par \par Uroflow is 2020 Preprocedure\par Peak flow 22.8 ml/s\par Average flow 9.6 ml/s\par

## 2022-01-21 ENCOUNTER — NON-APPOINTMENT (OUTPATIENT)
Age: 52
End: 2022-01-21

## 2022-01-26 ENCOUNTER — APPOINTMENT (OUTPATIENT)
Dept: UROLOGY | Facility: CLINIC | Age: 52
End: 2022-01-26
Payer: MEDICAID

## 2022-01-26 PROCEDURE — 99213 OFFICE O/P EST LOW 20 MIN: CPT

## 2022-01-26 RX ORDER — TROSPIUM CHLORIDE 60 MG/1
60 CAPSULE, EXTENDED RELEASE ORAL
Qty: 30 | Refills: 3 | Status: ACTIVE | COMMUNITY
Start: 2020-12-10 | End: 1900-01-01

## 2022-01-26 NOTE — ASSESSMENT
[FreeTextEntry1] : 51 year old 3 month s/p Urolift. \par Patient is doing well. Continued to be bothered by Urinary Frequency. Patient denies dysuria and gross hematuria. \par \par Uroflow and PVR was reviewed with patient. \par \par Treatment options for overactive bladder reviewed with patient including different medication and nerve stimulation and Botox. \par \par Patient wants to continue Trospium and follow up for scheduled 3 month Uroflow and PVR (6 month post procedure).

## 2022-01-26 NOTE — HISTORY OF PRESENT ILLNESS
[FreeTextEntry1] : This is a 51 year old male 3 month s/p Urolift on 10/08/2021 for bph. \par Patient presents to office today for Uroflow and PVR. \par \par Uroflow 01/26/2021 reveals a a initial rise to peak flow of 20.4 ml/s followed up a sustained flow that progressively decreases. Average flow rates was 9.5 ml/s. Voided volume was 436 mL. PVR was 96 mL. \par Internation Prostate Symptom Score was filled out with patient. \par Incomplete emptying- 3\par Frequency- 5\par Intermittency- 0\par Urgency- 2\par Weak stream- 2\par Straining- 0\par Sleeping- 0\par Quality of life- 3 Mixed. \par Patient is taking Trospium and finds that it alleviates symptoms 5/10. \par \par Patient states that his flow has improved and he no longer feels the needs to strain to urinate. Patient is still bothered by Urinary Frequency. Denies dysuria and gross hematuria. \par \par Uroflow 11/19/2021 reveals an initial rise to peak flow of 32.5 ml/s with progressive decrease in flow as bladder empties. Average flow rates is 11.1 ml/s. Voided volume 310 mL. PVR is 41 mL.\par \par Uroflow is 2020 Preprocedure\par Peak flow 22.8 ml/s\par Average flow 9.6 ml/s\par

## 2022-04-27 ENCOUNTER — APPOINTMENT (OUTPATIENT)
Dept: UROLOGY | Facility: CLINIC | Age: 52
End: 2022-04-27
Payer: MEDICAID

## 2022-04-27 PROCEDURE — 99214 OFFICE O/P EST MOD 30 MIN: CPT

## 2022-04-27 NOTE — ASSESSMENT
[FreeTextEntry1] : 52-year-old 6-month status post UroLift.\par \par Uroflow and PVR are stable.  Continues to be bothered by urinary frequency.  Reviewed with patient his uroflow and PVR.\par \par He is no longer taking trospium as he feels that this has not been helping his urinary frequency.  He does eat spicy food daily and drinks 2 cups of coffee daily.  Recommend behavioral modifications.\par \par Discussed with patient different medications.  Can consider beta 3 agonist.  Patient will try behavioral modifications first.\par \par Uroflow 4- reveals an initial rise to peak flow 24.7 mL/s followed by a gradual decrease to end of void.  Average flow rate 7.6 mL/s.  Voided volume 399 mL.   mL.  Double void PVR 23 mL.\par IPSS today.\par Incomplete emptying–3\par Frequency–4\par Intermittency–1\par Urgency–0\par Weak stream–3\par Straining–2\par Sleeping–0\par Quality of life–4 mostly dissatisfied.\par Patient currently taking no medication.  He was taking trospium in the past however did not feel any benefit and discontinued medication.\par \par Uroflow 01/26/2021 reveals a a initial rise to peak flow of 20.4 ml/s followed up a sustained flow that progressively decreases. Average flow rates was 9.5 ml/s. Voided volume was 436 mL. PVR was 96 mL. \par Internation Prostate Symptom Score was filled out with patient. \par Incomplete emptying- 3\par Frequency- 5\par Intermittency- 0\par Urgency- 2\par Weak stream- 2\par Straining- 0\par Sleeping- 0\par Quality of life- 3 Mixed. \par Patient is taking Trospium and finds that it alleviates symptoms 5/10.\par \par Uroflow 11/19/2021 reveals an initial rise to peak flow of 32.5 ml/s with progressive decrease in flow as bladder empties. Average flow rates is 11.1 ml/s. Voided volume 310 mL. PVR is 41 mL.\par \par Uroflow is 2020 Preprocedure\par Peak flow 22.8 ml/s\par Average flow 9.6 ml/s

## 2022-04-27 NOTE — PHYSICAL EXAM
[General Appearance - In No Acute Distress] : no acute distress [Abdomen Soft] : soft [Abdomen Tenderness] : non-tender [] : no respiratory distress [Oriented To Time, Place, And Person] : oriented to person, place, and time [Normal Station and Gait] : the gait and station were normal for the patient's age [No Focal Deficits] : no focal deficits

## 2022-04-27 NOTE — HISTORY OF PRESENT ILLNESS
[FreeTextEntry1] : 52-year-old male 6-month status post UroLift on 10- for BPH.  He presents for his 6-month post procedure uroflow and PVR.\par \par Patient continues to state his flow has improved.  He continues to have urinary frequency.  He reports urinary frequency mostly when he has to go on a long drive.  Patient states that he notices he urinates more frequently when he is nervous.  He reports eating spicy food daily as well as drinking 2 cups of coffee daily.\par \par Uroflow 4- reveals an initial rise to peak flow 24.7 mL/s followed by a gradual decrease to end of void.  Average flow rate 7.6 mL/s.  Voided volume 399 mL.   mL.  Double void PVR 23 mL.\par IPSS today.\par Incomplete emptying–3\par Frequency–4\par Intermittency–1\par Urgency–0\par Weak stream–3\par Straining–2\par Sleeping–0\par Quality of life–4 mostly dissatisfied.\par Patient currently taking no medication.  He was taking trospium in the past however did not feel any benefit and discontinued medication.\par \par Uroflow 01/26/2021 reveals a a initial rise to peak flow of 20.4 ml/s followed up a sustained flow that progressively decreases. Average flow rates was 9.5 ml/s. Voided volume was 436 mL. PVR was 96 mL. \par Internation Prostate Symptom Score was filled out with patient. \par Incomplete emptying- 3\par Frequency- 5\par Intermittency- 0\par Urgency- 2\par Weak stream- 2\par Straining- 0\par Sleeping- 0\par Quality of life- 3 Mixed. \par Patient is taking Trospium and finds that it alleviates symptoms 5/10.\par \par Uroflow 11/19/2021 reveals an initial rise to peak flow of 32.5 ml/s with progressive decrease in flow as bladder empties. Average flow rates is 11.1 ml/s. Voided volume 310 mL. PVR is 41 mL.\par \par Uroflow is 2020 Preprocedure\par Peak flow 22.8 ml/s\par Average flow 9.6 ml/s\par \par

## 2022-05-17 ENCOUNTER — APPOINTMENT (OUTPATIENT)
Dept: OPHTHALMOLOGY | Facility: CLINIC | Age: 52
End: 2022-05-17
Payer: MEDICAID

## 2022-05-17 ENCOUNTER — OUTPATIENT (OUTPATIENT)
Dept: OUTPATIENT SERVICES | Facility: HOSPITAL | Age: 52
LOS: 1 days | Discharge: HOME | End: 2022-05-17

## 2022-05-17 DIAGNOSIS — Z98.890 OTHER SPECIFIED POSTPROCEDURAL STATES: Chronic | ICD-10-CM

## 2022-05-17 PROCEDURE — ZZZZZ: CPT

## 2022-10-06 ENCOUNTER — NON-APPOINTMENT (OUTPATIENT)
Age: 52
End: 2022-10-06

## 2022-10-06 ENCOUNTER — OUTPATIENT (OUTPATIENT)
Dept: OUTPATIENT SERVICES | Facility: HOSPITAL | Age: 52
LOS: 1 days | Discharge: HOME | End: 2022-10-06

## 2022-10-06 ENCOUNTER — APPOINTMENT (OUTPATIENT)
Dept: DERMATOLOGY | Facility: CLINIC | Age: 52
End: 2022-10-06

## 2022-10-06 VITALS
HEIGHT: 67 IN | WEIGHT: 206 LBS | BODY MASS INDEX: 32.33 KG/M2 | HEART RATE: 83 BPM | SYSTOLIC BLOOD PRESSURE: 135 MMHG | DIASTOLIC BLOOD PRESSURE: 86 MMHG | OXYGEN SATURATION: 95 % | TEMPERATURE: 97.9 F

## 2022-10-06 DIAGNOSIS — Z98.890 OTHER SPECIFIED POSTPROCEDURAL STATES: Chronic | ICD-10-CM

## 2022-10-06 PROCEDURE — 99204 OFFICE O/P NEW MOD 45 MIN: CPT | Mod: GC

## 2022-10-06 RX ORDER — TRIAMCINOLONE ACETONIDE 1 MG/G
0.1 OINTMENT TOPICAL TWICE DAILY
Qty: 80 | Refills: 2 | Status: ACTIVE | COMMUNITY
Start: 2022-10-06 | End: 1900-01-01

## 2022-10-06 NOTE — PHYSICAL EXAM
[Face] : Face [R Arm] : R Arm [L Leg] : L Leg [FreeTextEntry3] : Facial rash - faint hyperpigmented patches suborbital blt and - papular lesions follicular beard area\par - macular hyperpigmented round patches with superficial scale hands and feet\par - hyperpigmented papule with regular colors and border\par

## 2022-10-06 NOTE — END OF VISIT
[] : Resident [FreeTextEntry3] : I saw and examined patient with resident. I agree with assessment and plan. Patient advised to come for f/u when the dyshidrotic eczema on palms and soles is more active for further evaluation.

## 2022-10-06 NOTE — HISTORY OF PRESENT ILLNESS
[FreeTextEntry1] : facial rash [de-identified] : 51YO M with PMH of HIV, dysuria and BPH presenting to the dermatology clinic for multiple problems. Patient complains of facial rash that has been ongoing for a few weeks. The rash is irritated and limited to his skin under the eyes. The itching is alleviated by OTC skin . Pt states the rash is improved but not completely healed. \par \par Pt also complains of rashes on his right hand and on his left foot, sometimes with blisters, now just with dry skin blister remnants. Unclear when these issues started but have been ongoing for at least 2-3 months. \par

## 2022-10-06 NOTE — ASSESSMENT
[FreeTextEntry1] : 52YOM with PMH of HIV, dysuria and BPH presenting to the dermatology clinic for multiple problems. Patient complains of facial rash that has been ongoing for a few weeks. The rash is limited to his skin under his right eye. The itching is alleviated with OTC skin . Pt states the rash is improved but not completely healed. \par Pt also complains of rash/mole on his right hand and on his left foot. Unclear when these issues started but have been ongoing for at least 2-3 months. \par \par 1. Contact dermatitis infraorbital\par -hydrocortisone 2.5% cream\par 2. Folliculitis barbae/  \par \par -clindamycin phosphate lotion \par \par 2. 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 Dyshydrotic eczma/ Contact dermatitis\par -triamcinolone 0.1% oint bid on palms and feet/ SED/ don’t use on face\par \par \par RTC in 3 months \par

## 2022-10-13 DIAGNOSIS — L73.9 FOLLICULAR DISORDER, UNSPECIFIED: ICD-10-CM

## 2022-10-13 DIAGNOSIS — L25.9 UNSPECIFIED CONTACT DERMATITIS, UNSPECIFIED CAUSE: ICD-10-CM

## 2022-10-27 ENCOUNTER — APPOINTMENT (OUTPATIENT)
Dept: UROLOGY | Facility: CLINIC | Age: 52
End: 2022-10-27

## 2022-10-27 DIAGNOSIS — R33.9 RETENTION OF URINE, UNSPECIFIED: ICD-10-CM

## 2022-10-27 PROCEDURE — 99213 OFFICE O/P EST LOW 20 MIN: CPT | Mod: 95

## 2022-11-14 RX ORDER — MIRABEGRON 50 MG/1
50 TABLET, FILM COATED, EXTENDED RELEASE ORAL
Qty: 90 | Refills: 1 | Status: ACTIVE | COMMUNITY
Start: 2022-10-27

## 2022-12-02 RX ORDER — TOLTERODINE TARTRATE 4 MG/1
4 CAPSULE, EXTENDED RELEASE ORAL
Qty: 30 | Refills: 3 | Status: ACTIVE | COMMUNITY
Start: 2022-12-02 | End: 1900-01-01

## 2022-12-02 NOTE — HISTORY OF PRESENT ILLNESS
[FreeTextEntry1] : 52-year-old 6-month status post UroLift. oct 2021\par \par Uroflow and PVR are stable. Continues to be bothered by urinary frequency. Reviewed with patient his uroflow and PVR.\par \par He is no longer taking trospium as he feels that this has not been helping his urinary frequency. He does eat spicy food daily and drinks 2 cups of coffee daily. Recommend behavioral modifications.\par \par Discussed with patient different medications. Can consider beta 3 agonist. Patient will try behavioral modifications first.\par \par Uroflow 4- reveals an initial rise to peak flow 24.7 mL/s followed by a gradual decrease to end of void. Average flow rate 7.6 mL/s. Voided volume 399 mL.  mL. Double void PVR 23 mL.\par IPSS today.\par Incomplete emptying–3\par Frequency–4\par Intermittency–1\par Urgency–0\par Weak stream–3\par Straining–2\par Sleeping–0\par Quality of life–4 mostly dissatisfied.\par Patient currently taking no medication. He was taking trospium in the past however did not feel any benefit and discontinued medication.\par \par Uroflow 01/26/2021 reveals a a initial rise to peak flow of 20.4 ml/s followed up a sustained flow that progressively decreases. Average flow rates was 9.5 ml/s. Voided volume was 436 mL. PVR was 96 mL. \par Internation Prostate Symptom Score was filled out with patient. \par Incomplete emptying- 3\par Frequency- 5\par Intermittency- 0\par Urgency- 2\par Weak stream- 2\par Straining- 0\par Sleeping- 0\par Quality of life- 3 Mixed. \par Patient is taking Trospium and finds that it alleviates symptoms 5/10.\par \par Uroflow 11/19/2021 reveals an initial rise to peak flow of 32.5 ml/s with progressive decrease in flow as bladder empties. Average flow rates is 11.1 ml/s. Voided volume 310 mL. PVR is 41 mL.\par \par Uroflow is 2020 Preprocedure\par Peak flow 22.8 ml/s\par Average flow 9.6 ml/s. \par \par \par \par \par [phone]\par [email or web address]\par no  prior testing

## 2022-12-02 NOTE — ASSESSMENT
[FreeTextEntry1] : 52-year-old 6-month status post UroLift. oct 2021\par \par Uroflow and PVR are stable. Continues to be bothered by urinary frequency. Reviewed with patient his uroflow and PVR.\par \par He is no longer taking trospium as he feels that this has not been helping his urinary frequency. He does eat spicy food daily and drinks 2 cups of coffee daily. Recommend behavioral modifications.\par \par Discussed with patient different medications. Can consider beta 3 agonist. Patient will try behavioral modifications first.\par \par Uroflow 4- reveals an initial rise to peak flow 24.7 mL/s followed by a gradual decrease to end of void. Average flow rate 7.6 mL/s. Voided volume 399 mL.  mL. Double void PVR 23 mL.\par IPSS today.\par Incomplete emptying–3\par Frequency–4\par Intermittency–1\par Urgency–0\par Weak stream–3\par Straining–2\par Sleeping–0\par Quality of life–4 mostly dissatisfied.\par Patient currently taking no medication. He was taking trospium in the past however did not feel any benefit and discontinued medication.\par \par Uroflow 01/26/2021 reveals a a initial rise to peak flow of 20.4 ml/s followed up a sustained flow that progressively decreases. Average flow rates was 9.5 ml/s. Voided volume was 436 mL. PVR was 96 mL. \par Internation Prostate Symptom Score was filled out with patient. \par Incomplete emptying- 3\par Frequency- 5\par Intermittency- 0\par Urgency- 2\par Weak stream- 2\par Straining- 0\par Sleeping- 0\par Quality of life- 3 Mixed. \par Patient is taking Trospium and finds that it alleviates symptoms 5/10.\par \par Uroflow 11/19/2021 reveals an initial rise to peak flow of 32.5 ml/s with progressive decrease in flow as bladder empties. Average flow rates is 11.1 ml/s. Voided volume 310 mL. PVR is 41 mL.\par \par Uroflow is 2020 Preprocedure\par Peak flow 22.8 ml/s\par Average flow 9.6 ml/s. \par

## 2023-01-09 ENCOUNTER — APPOINTMENT (OUTPATIENT)
Dept: UROLOGY | Facility: CLINIC | Age: 53
End: 2023-01-09
Payer: MEDICAID

## 2023-01-09 PROCEDURE — 99213 OFFICE O/P EST LOW 20 MIN: CPT | Mod: 95

## 2023-01-09 NOTE — HISTORY OF PRESENT ILLNESS
[Home] : at home, [unfilled] , at the time of the visit. [Medical Office: ([facility])___] : at the medical office located in  [Verbal consent obtained from patient] : the patient, [unfilled] [FreeTextEntry1] : 52-year-old 6-month status post UroLift. oct 2021\par \par He is very happy with the flow since urolift. Uroflow and PVR are stable. Continues to be bothered by urinary frequency.\par \par He is no longer taking trospium as he feels that this has not been helping his urinary frequency-- he was switched to tolterodine but has only tried it for two weeks without improvement yet.  insurance denied myrbetriq. He does eat spicy food daily and drinks 2 cups of coffee daily. Recommend behavioral modifications but he did not decrease these.\par \par Uroflow 4- reveals an initial rise to peak flow 24.7 mL/s followed by a gradual decrease to end of void. Average flow rate 7.6 mL/s. Voided volume 399 mL.  mL. Double void PVR 23 mL.\par IPSS today.\par Incomplete emptying–3\par Frequency–4\par Intermittency–1\par Urgency–0\par Weak stream–3\par Straining–2\par Sleeping–0\par Quality of life–4 mostly dissatisfied.\par Patient currently taking no medication. He was taking trospium in the past however did not feel any benefit and discontinued medication.\par \par Uroflow 01/26/2021 reveals a a initial rise to peak flow of 20.4 ml/s followed up a sustained flow that progressively decreases. Average flow rates was 9.5 ml/s. Voided volume was 436 mL. PVR was 96 mL. \par Internation Prostate Symptom Score was filled out with patient. \par Incomplete emptying- 3\par Frequency- 5\par Intermittency- 0\par Urgency- 2\par Weak stream- 2\par Straining- 0\par Sleeping- 0\par Quality of life- 3 Mixed. \par Patient is taking Trospium and finds that it alleviates symptoms 5/10.\par \par Uroflow 11/19/2021 reveals an initial rise to peak flow of 32.5 ml/s with progressive decrease in flow as bladder empties. Average flow rates is 11.1 ml/s. Voided volume 310 mL. PVR is 41 mL.\par \par Uroflow is 2020 Preprocedure\par Peak flow 22.8 ml/s\par Average flow 9.6 ml/s. \par \par patient now with stronger flow since urolift but still w urgency and frequency\par has already tried trospium and oxybutynin\par \par \par \par [phone]\par [email or web address]

## 2023-01-09 NOTE — ASSESSMENT
[FreeTextEntry1] : 52-year-old 6-month status post UroLift. oct 2021\par \par He is very happy with the flow since urolift. Uroflow and PVR are stable. Continues to be bothered by urinary frequency.\par \par He is no longer taking trospium as he feels that this has not been helping his urinary frequency-- he was switched to tolterodine but has only tried it for two weeks without improvement yet.  insurance denied myrbetriq. He does eat spicy food daily and drinks 2 cups of coffee daily. Recommend behavioral modifications but he did not decrease these.\par \par Uroflow 4- reveals an initial rise to peak flow 24.7 mL/s followed by a gradual decrease to end of void. Average flow rate 7.6 mL/s. Voided volume 399 mL.  mL. Double void PVR 23 mL.\par IPSS today.\par Incomplete emptying–3\par Frequency–4\par Intermittency–1\par Urgency–0\par Weak stream–3\par Straining–2\par Sleeping–0\par Quality of life–4 mostly dissatisfied.\par Patient currently taking no medication. He was taking trospium in the past however did not feel any benefit and discontinued medication.\par \par Uroflow 01/26/2021 reveals a a initial rise to peak flow of 20.4 ml/s followed up a sustained flow that progressively decreases. Average flow rates was 9.5 ml/s. Voided volume was 436 mL. PVR was 96 mL. \par Internation Prostate Symptom Score was filled out with patient. \par Incomplete emptying- 3\par Frequency- 5\par Intermittency- 0\par Urgency- 2\par Weak stream- 2\par Straining- 0\par Sleeping- 0\par Quality of life- 3 Mixed. \par Patient is taking Trospium and finds that it alleviates symptoms 5/10.\par \par Uroflow 11/19/2021 reveals an initial rise to peak flow of 32.5 ml/s with progressive decrease in flow as bladder empties. Average flow rates is 11.1 ml/s. Voided volume 310 mL. PVR is 41 mL.\par \par Uroflow is 2020 Preprocedure\par Peak flow 22.8 ml/s\par Average flow 9.6 ml/s. \par \par patient now with stronger flow since urolift but still w urgency and frequency\par has already tried trospium and oxybutyn

## 2023-01-12 ENCOUNTER — APPOINTMENT (OUTPATIENT)
Dept: DERMATOLOGY | Facility: CLINIC | Age: 53
End: 2023-01-12
Payer: MEDICAID

## 2023-01-12 ENCOUNTER — OUTPATIENT (OUTPATIENT)
Dept: OUTPATIENT SERVICES | Facility: HOSPITAL | Age: 53
LOS: 1 days | Discharge: HOME | End: 2023-01-12

## 2023-01-12 VITALS
TEMPERATURE: 97 F | SYSTOLIC BLOOD PRESSURE: 119 MMHG | WEIGHT: 206 LBS | DIASTOLIC BLOOD PRESSURE: 81 MMHG | HEIGHT: 67 IN | OXYGEN SATURATION: 98 % | BODY MASS INDEX: 32.33 KG/M2 | HEART RATE: 101 BPM

## 2023-01-12 DIAGNOSIS — L73.8 OTHER SPECIFIED FOLLICULAR DISORDERS: ICD-10-CM

## 2023-01-12 DIAGNOSIS — D23.9 OTHER BENIGN NEOPLASM OF SKIN, UNSPECIFIED: ICD-10-CM

## 2023-01-12 DIAGNOSIS — Z98.890 OTHER SPECIFIED POSTPROCEDURAL STATES: Chronic | ICD-10-CM

## 2023-01-12 DIAGNOSIS — L30.1 DYSHIDROSIS [POMPHOLYX]: ICD-10-CM

## 2023-01-12 DIAGNOSIS — L73.9 FOLLICULAR DISORDER, UNSPECIFIED: ICD-10-CM

## 2023-01-12 PROCEDURE — 99214 OFFICE O/P EST MOD 30 MIN: CPT | Mod: GC

## 2023-01-12 RX ORDER — LEVOCETIRIZINE DIHYDROCHLORIDE 5 MG/1
5 TABLET ORAL TWICE DAILY
Qty: 15 | Refills: 0 | Status: ACTIVE | COMMUNITY
Start: 2023-01-12 | End: 1900-01-01

## 2023-01-12 RX ORDER — HYDROCORTISONE 25 MG/G
2.5 OINTMENT TOPICAL TWICE DAILY
Qty: 1 | Refills: 3 | Status: ACTIVE | COMMUNITY
Start: 2023-01-12 | End: 1900-01-01

## 2023-01-12 RX ORDER — HYDROCORTISONE 25 MG/G
2.5 CREAM TOPICAL TWICE DAILY
Qty: 60 | Refills: 1 | Status: DISCONTINUED | COMMUNITY
Start: 2022-10-06 | End: 2023-01-12

## 2023-01-12 NOTE — PHYSICAL EXAM
[FreeTextEntry3] : slightly xerotic hyperpigmented patches blt eyelids; \par hands better/ no active lesions and feet with few eryth hyperpigmented vesicles

## 2023-01-12 NOTE — ASSESSMENT
[FreeTextEntry1] : 52 Year old Male with PMH of HIV, dysuria, BPH, contact dermatitis infraorbital, folliculitis barbae, dermatofibroma, dyshidrotic eczema came here for follow up visit. Reports no new active complaints. Patient reports that there is minimal improvement in his infraorbital CD. Although his other conditions did improved since last visit. \par \par #Contact dermatitis infraorbital\par -hydrocortisone 2.5% cream changed to Hydrocortisone ointment 2.5% bid prn rash on eyelids / sed including risk of skin atrophy, steroid acne/ rosacea and increased ocular pressure/ no se so far.  \par \par #Folliculitis barbae- better\par -clindamycin phosphate lotion \par -Improvement seen. Continue with same\par \par # Dyshidrotic eczema/ Contact dermatitis: \par - continue with triamcinolone on hands but also start on the feet lesions/ SED\par - can add xyzal otc to claritin.\par RTC in 1-3  months depending on how pt is \par

## 2023-01-12 NOTE — HISTORY OF PRESENT ILLNESS
[FreeTextEntry1] : Follow up visit  [de-identified] : 52 Year old Male with PMH of HIV, dysuria, BPH, contact dermatitis infraorbital, folliculitis barbae, dermato fibroma, dyshidrotic eczema came here for follow up visit. Reports no new active complaints. Patient reports that the infraorbital contact dermatitis is inadequately controlled with using hydrocortisone cream 2.5% x about 2 weeks, still itchy and scaly at times with the hydrocortisone with some stuffy nose but no discharge with itchy eyes . His other conditions did improved since last visit including dyshidrotic eczema on the hands but extending to his feet now/ new lesion and folliculitis barbae better with clindamycin.\par \par \par \par \par

## 2023-01-17 DIAGNOSIS — L25.9 UNSPECIFIED CONTACT DERMATITIS, UNSPECIFIED CAUSE: ICD-10-CM

## 2023-01-17 DIAGNOSIS — L73.9 FOLLICULAR DISORDER, UNSPECIFIED: ICD-10-CM

## 2023-02-02 ENCOUNTER — APPOINTMENT (OUTPATIENT)
Dept: UROLOGY | Facility: CLINIC | Age: 53
End: 2023-02-02
Payer: MEDICAID

## 2023-02-02 PROCEDURE — 99213 OFFICE O/P EST LOW 20 MIN: CPT | Mod: 95

## 2023-02-02 NOTE — ASSESSMENT
[FreeTextEntry1] : 52-year-old status post UroLift. oct 2021\par \par He is very happy with the flow since urolift. Uroflow and PVR are stable. Continues to be bothered by urinary frequency.\par \par He took oxybutynin and trospium in the past without improvement-- he was switched to myrbetriq but insurance did not approve so he went on tolterodine but that also didn’t not improve after 5 weeks. He does eat spicy food daily and drinks 2 cups of coffee daily. Recommend behavioral modifications but he did not decrease these.\par \par Uroflow 4- reveals an initial rise to peak flow 24.7 mL/s followed by a gradual decrease to end of void. Average flow rate 7.6 mL/s. Voided volume 399 mL.  mL. Double void PVR 23 mL.\par \par Uroflow 11/19/2021 reveals an initial rise to peak flow of 32.5 ml/s with progressive decrease in flow as bladder empties. Average flow rates is 11.1 ml/s. Voided volume 310 mL. PVR is 41 mL.\par \par Uroflow in 2020 Preprocedure\par Peak flow 22.8 ml/s\par Average flow 9.6 ml/s. \par

## 2023-02-02 NOTE — HISTORY OF PRESENT ILLNESS
[Home] : at home, [unfilled] , at the time of the visit. [Medical Office: ([facility])___] : at the medical office located in  [Verbal consent obtained from patient] : the patient, [unfilled] [FreeTextEntry1] : TELEMEDICINE -- [facility] FOR FOLLOW UP APPOINTMENT\par \par The patient-doctor relationship has been established in a face to face fashion via real time video/audio HIPAA compliant communication using telemedicine software. The patient's identity has been confirmed. The patient was previously emailed a copy of the telemedicine consent. They have had a chance to review and has now given verbal consent and has requested care to be assessed and treated through telemedicine and understands there maybe limitations in this process and they may need further follow up care in the office and or hospital settings.\par \par The patient denies fevers, chills, nausea and or vomiting and no unexplained weight loss.\par \par All pertinent parts of the patient PFSH (past medical, family and social histories), laboratory, radiological studies and physician notes were reviewed prior to starting the face to face portion of the telemedicine visit. Questionnaire results were discussed with patient.\par \par ==================================================================================================== \par \par 52-year-old status post UroLift. oct 2021\par \par He is very happy with the flow since urolift. Uroflow and PVR are stable. Continues to be bothered by urinary frequency.\par \par He took oxybutynin and trospium in the past without improvement-- he was switched to myrbetriq but insurance did not approve so he went on tolterodine but that also didn’t not improve after 5 weeks. He does eat spicy food daily and drinks 2 cups of coffee daily. Recommend behavioral modifications but he did not decrease these.\par \par Uroflow 4- reveals an initial rise to peak flow 24.7 mL/s followed by a gradual decrease to end of void. Average flow rate 7.6 mL/s. Voided volume 399 mL.  mL. Double void PVR 23 mL.\par \par Uroflow 11/19/2021 reveals an initial rise to peak flow of 32.5 ml/s with progressive decrease in flow as bladder empties. Average flow rates is 11.1 ml/s. Voided volume 310 mL. PVR is 41 mL.\par \par Uroflow in 2020 Preprocedure\par Peak flow 22.8 ml/s\par Average flow 9.6 ml/s. \par \par \par [phone]\par [email or web address]

## 2023-02-16 ENCOUNTER — NON-APPOINTMENT (OUTPATIENT)
Age: 53
End: 2023-02-16

## 2023-02-17 ENCOUNTER — APPOINTMENT (OUTPATIENT)
Dept: UROLOGY | Facility: CLINIC | Age: 53
End: 2023-02-17
Payer: MEDICAID

## 2023-02-17 ENCOUNTER — LABORATORY RESULT (OUTPATIENT)
Age: 53
End: 2023-02-17

## 2023-02-17 VITALS
DIASTOLIC BLOOD PRESSURE: 79 MMHG | WEIGHT: 206 LBS | SYSTOLIC BLOOD PRESSURE: 115 MMHG | RESPIRATION RATE: 16 BRPM | OXYGEN SATURATION: 98 % | BODY MASS INDEX: 32.33 KG/M2 | TEMPERATURE: 97.2 F | HEART RATE: 89 BPM | HEIGHT: 67 IN

## 2023-02-17 DIAGNOSIS — R39.15 URGENCY OF URINATION: ICD-10-CM

## 2023-02-17 DIAGNOSIS — N13.8 BENIGN PROSTATIC HYPERPLASIA WITH LOWER URINARY TRACT SYMPMS: ICD-10-CM

## 2023-02-17 DIAGNOSIS — R35.0 FREQUENCY OF MICTURITION: ICD-10-CM

## 2023-02-17 DIAGNOSIS — N40.1 BENIGN PROSTATIC HYPERPLASIA WITH LOWER URINARY TRACT SYMPMS: ICD-10-CM

## 2023-02-17 PROCEDURE — 81003 URINALYSIS AUTO W/O SCOPE: CPT | Mod: QW

## 2023-02-17 PROCEDURE — 99214 OFFICE O/P EST MOD 30 MIN: CPT

## 2023-02-20 LAB
BILIRUB UR QL STRIP: NORMAL
COLLECTION METHOD: NORMAL
GLUCOSE UR-MCNC: NORMAL
HCG UR QL: 0.2 EU/DL
HGB UR QL STRIP.AUTO: NORMAL
KETONES UR-MCNC: NORMAL
LEUKOCYTE ESTERASE UR QL STRIP: NORMAL
NITRITE UR QL STRIP: NORMAL
PH UR STRIP: 6
PROT UR STRIP-MCNC: NORMAL
SP GR UR STRIP: 1.02

## 2023-02-20 NOTE — HISTORY OF PRESENT ILLNESS
[Currently Experiencing ___] :  [unfilled] [Urinary Urgency] : urinary urgency [Urinary Frequency] : urinary frequency [FreeTextEntry1] : Mr. Downey is a 53 year old male referred by Dr. Cardenas for urinary frequency and urgency. The patient had a urolift done 10/2021. He has continued urinary urgency and frequency since procedure. He states is stream is excellent and denies any feeling of incomplete emptying. He has nocturia x0. He has noticed that caffeine does increase his symptoms. He denies any UTIs, dysuria, or hematuria. \par \par He has been tried on several medications which he felt did not improve his symptoms at all. \par \par He denies any bowel or back issues.

## 2023-02-20 NOTE — ASSESSMENT
[FreeTextEntry1] : Mr. Downey is a 53 year old male with urinary frequency and urgency that has not been relieved with medications. We discussed PTNS in detail and patient would like to proceed with this. We will get approval from his insurance and set him for PTNS. All of his questions were otherwise answered. He also had dipstick blood today- if confirmed on micro, he will need renal US and cystoscopy. Total time=30 min. Seen with NP Jes.

## 2023-04-20 ENCOUNTER — OUTPATIENT (OUTPATIENT)
Dept: OUTPATIENT SERVICES | Facility: HOSPITAL | Age: 53
LOS: 1 days | End: 2023-04-20
Payer: MEDICAID

## 2023-04-20 ENCOUNTER — APPOINTMENT (OUTPATIENT)
Dept: DERMATOLOGY | Facility: CLINIC | Age: 53
End: 2023-04-20

## 2023-04-20 ENCOUNTER — APPOINTMENT (OUTPATIENT)
Dept: DERMATOLOGY | Facility: CLINIC | Age: 53
End: 2023-04-20
Payer: MEDICAID

## 2023-04-20 VITALS
TEMPERATURE: 97.2 F | BODY MASS INDEX: 32.33 KG/M2 | OXYGEN SATURATION: 100 % | SYSTOLIC BLOOD PRESSURE: 143 MMHG | DIASTOLIC BLOOD PRESSURE: 79 MMHG | WEIGHT: 206 LBS | HEIGHT: 67 IN | HEART RATE: 90 BPM

## 2023-04-20 DIAGNOSIS — L25.9 UNSPECIFIED CONTACT DERMATITIS, UNSPECIFIED CAUSE: ICD-10-CM

## 2023-04-20 DIAGNOSIS — Z00.00 ENCOUNTER FOR GENERAL ADULT MEDICAL EXAMINATION WITHOUT ABNORMAL FINDINGS: ICD-10-CM

## 2023-04-20 DIAGNOSIS — Z98.890 OTHER SPECIFIED POSTPROCEDURAL STATES: Chronic | ICD-10-CM

## 2023-04-20 PROCEDURE — 99214 OFFICE O/P EST MOD 30 MIN: CPT

## 2023-04-20 PROCEDURE — 99214 OFFICE O/P EST MOD 30 MIN: CPT | Mod: GC

## 2023-04-20 RX ORDER — METRONIDAZOLE 7.5 MG/G
0.75 CREAM TOPICAL DAILY
Qty: 1 | Refills: 2 | Status: ACTIVE | COMMUNITY
Start: 2023-04-20 | End: 1900-01-01

## 2023-04-20 RX ORDER — CLINDAMYCIN PHOSPHATE 10 MG/ML
1 LOTION TOPICAL TWICE DAILY
Qty: 1 | Refills: 1 | Status: ACTIVE | COMMUNITY
Start: 2022-10-06 | End: 1900-01-01

## 2023-04-21 NOTE — END OF VISIT
[FreeTextEntry3] : [employer] was down systemwide on 4/20/23 while the note was started. The note was completed on 4/21/23. [] : Resident

## 2023-04-21 NOTE — PHYSICAL EXAM
[FreeTextEntry3] : - hyperpigmented papules coalescing into eryth patch and plaque under b/l eyelids; \par - less scaly patches hands blt

## 2023-04-21 NOTE — HISTORY OF PRESENT ILLNESS
[FreeTextEntry1] : c/o bumps face [de-identified] : \par \par - uses facial cleanser and mositurizer\par - persistent plaques on faces; L cheek\par \par - metronidazole\par - try hydrocortisone again

## 2023-04-21 NOTE — HISTORY OF PRESENT ILLNESS
[FreeTextEntry1] : c/o bumps face [de-identified] : \par \par - uses facial cleanser and mositurizer\par - persistent plaques on faces; L cheek\par \par - metronidazole\par - try hydrocortisone again

## 2023-04-24 DIAGNOSIS — L30.1 DYSHIDROSIS [POMPHOLYX]: ICD-10-CM

## 2023-04-24 DIAGNOSIS — L71.9 ROSACEA, UNSPECIFIED: ICD-10-CM

## 2023-10-12 ENCOUNTER — OUTPATIENT (OUTPATIENT)
Dept: OUTPATIENT SERVICES | Facility: HOSPITAL | Age: 53
LOS: 1 days | End: 2023-10-12
Payer: MEDICAID

## 2023-10-12 ENCOUNTER — RESULT REVIEW (OUTPATIENT)
Age: 53
End: 2023-10-12

## 2023-10-12 DIAGNOSIS — Z00.8 ENCOUNTER FOR OTHER GENERAL EXAMINATION: ICD-10-CM

## 2023-10-12 DIAGNOSIS — Z98.890 OTHER SPECIFIED POSTPROCEDURAL STATES: Chronic | ICD-10-CM

## 2023-10-12 DIAGNOSIS — N31.1 REFLEX NEUROPATHIC BLADDER, NOT ELSEWHERE CLASSIFIED: ICD-10-CM

## 2023-10-12 PROCEDURE — 76770 US EXAM ABDO BACK WALL COMP: CPT | Mod: 26

## 2023-10-12 PROCEDURE — 76770 US EXAM ABDO BACK WALL COMP: CPT

## 2023-10-13 DIAGNOSIS — N31.1 REFLEX NEUROPATHIC BLADDER, NOT ELSEWHERE CLASSIFIED: ICD-10-CM

## 2024-03-08 ENCOUNTER — APPOINTMENT (OUTPATIENT)
Dept: ORTHOPEDIC SURGERY | Facility: CLINIC | Age: 54
End: 2024-03-08
Payer: MEDICAID

## 2024-03-08 PROCEDURE — 73080 X-RAY EXAM OF ELBOW: CPT | Mod: RT

## 2024-03-08 PROCEDURE — 99204 OFFICE O/P NEW MOD 45 MIN: CPT

## 2024-03-08 PROCEDURE — 73110 X-RAY EXAM OF WRIST: CPT | Mod: RT

## 2024-03-08 NOTE — ASSESSMENT
[FreeTextEntry1] : The patient comes in with pain in his right elbow. The patient states he has pain when lifting and grabbing. He states this started in December. The patient states while traveling he had pain while lifting his carry-on. He states he has pain in his wrist as well but it comes and goes.  Right elbow: Nontender palpation over lateral condyle medial epicondyle tip of olecranon ulnar nerve, biceps, pain with resisted flexion at the biceps, no pain with supination or pronation, neurovascular intact  Right wrist: No gross deformity visualized, nontender ovation over SL LT TFCC slightly tender palpation over fourth dorsal extensor compartment, negative Tinel's, negative Durkan's, pain with resisted extension of the wrist at the fourth compartment, neurovascular intact  X-RAY right elbow 3 views is negative X-RAY right wrist 3 views is negative  Patient has a distal biceps tendinitis as well as a fourth compartment tendinitis.  The patient was given a cock-up brace when he is active.  I am hoping this will help rest the wrist and minimize the use as well of the elbow to strain the biceps.  The patient was shown stretches in the office today. The patient was advised to make positional modification when lifting. He was advised rest is good.  As for taking anti-inflammatories, the patient states that he is unable to secondary to his kidneys so he will take Tylenol as needed.  The patient will follow up in a month for repeat evaluation.  If he is still having discomfort and pain in a month I will send him for MRIs.

## 2024-04-30 ENCOUNTER — APPOINTMENT (OUTPATIENT)
Dept: ORTHOPEDIC SURGERY | Facility: CLINIC | Age: 54
End: 2024-04-30
Payer: COMMERCIAL

## 2024-04-30 DIAGNOSIS — M65.841 OTHER SYNOVITIS AND TENOSYNOVITIS, RIGHT HAND: ICD-10-CM

## 2024-04-30 DIAGNOSIS — S46.211A STRAIN OF MUSCLE, FASCIA AND TENDON OF OTHER PARTS OF BICEPS, RIGHT ARM, INITIAL ENCOUNTER: ICD-10-CM

## 2024-04-30 PROCEDURE — 99213 OFFICE O/P EST LOW 20 MIN: CPT

## 2024-04-30 NOTE — ASSESSMENT
[FreeTextEntry1] : Patient comes in for follow-up.  He says the elbow does not bother him as much but the wrist still bothers him.  He says that he woke up in pain with the knee and took some anti-inflammatories which have helped him a little bit but he still has pain in the wrist.  Right wrist: No gross deformity visualized, nontender ovation over SL LT TFCC slightly tender palpation over fourth dorsal extensor compartment, negative Tinel's, negative Durkan's, pain with resisted extension of the wrist at the fourth compartment, neurovascular intact   Patient has a distal biceps tendinitis as well as a fourth compartment tendinitis.  I believe that the biceps tendinitis is significantly better however he still having significant pain in the wrist.  I am recommending that we get an MRI of the wrist for reevaluation.  He was only using the brace at night and recommending he use the brace during the day as well now.

## 2024-08-16 ENCOUNTER — APPOINTMENT (OUTPATIENT)
Dept: UROLOGY | Facility: CLINIC | Age: 54
End: 2024-08-16
Payer: COMMERCIAL

## 2024-08-16 DIAGNOSIS — R39.15 URGENCY OF URINATION: ICD-10-CM

## 2024-08-16 PROCEDURE — 64566 NEUROELTRD STIM POST TIBIAL: CPT

## 2024-08-23 ENCOUNTER — APPOINTMENT (OUTPATIENT)
Dept: UROLOGY | Facility: CLINIC | Age: 54
End: 2024-08-23

## 2024-08-23 PROCEDURE — 64566 NEUROELTRD STIM POST TIBIAL: CPT

## 2024-08-30 ENCOUNTER — APPOINTMENT (OUTPATIENT)
Dept: UROLOGY | Facility: CLINIC | Age: 54
End: 2024-08-30

## 2024-08-30 LAB
BILIRUB UR QL STRIP: NORMAL
COLLECTION METHOD: NORMAL
GLUCOSE UR-MCNC: NORMAL
HCG UR QL: 0.2 EU/DL
HGB UR QL STRIP.AUTO: NORMAL
KETONES UR-MCNC: NORMAL
LEUKOCYTE ESTERASE UR QL STRIP: NORMAL
NITRITE UR QL STRIP: NORMAL
PH UR STRIP: 7
PROT UR STRIP-MCNC: NORMAL
SP GR UR STRIP: 1.02

## 2024-08-30 PROCEDURE — 64566 NEUROELTRD STIM POST TIBIAL: CPT

## 2024-08-30 PROCEDURE — 81003 URINALYSIS AUTO W/O SCOPE: CPT | Mod: QW

## 2024-09-06 ENCOUNTER — APPOINTMENT (OUTPATIENT)
Dept: UROLOGY | Facility: CLINIC | Age: 54
End: 2024-09-06
Payer: COMMERCIAL

## 2024-09-06 PROCEDURE — 64566 NEUROELTRD STIM POST TIBIAL: CPT

## 2024-09-13 ENCOUNTER — APPOINTMENT (OUTPATIENT)
Dept: UROLOGY | Facility: CLINIC | Age: 54
End: 2024-09-13
Payer: COMMERCIAL

## 2024-09-13 PROCEDURE — 64566 NEUROELTRD STIM POST TIBIAL: CPT

## 2024-09-19 ENCOUNTER — APPOINTMENT (OUTPATIENT)
Dept: UROLOGY | Facility: CLINIC | Age: 54
End: 2024-09-19
Payer: COMMERCIAL

## 2024-09-19 PROCEDURE — 64566 NEUROELTRD STIM POST TIBIAL: CPT

## 2024-09-27 ENCOUNTER — APPOINTMENT (OUTPATIENT)
Dept: UROLOGY | Facility: CLINIC | Age: 54
End: 2024-09-27
Payer: COMMERCIAL

## 2024-09-27 DIAGNOSIS — N40.1 BENIGN PROSTATIC HYPERPLASIA WITH LOWER URINARY TRACT SYMPMS: ICD-10-CM

## 2024-09-27 DIAGNOSIS — N13.8 BENIGN PROSTATIC HYPERPLASIA WITH LOWER URINARY TRACT SYMPMS: ICD-10-CM

## 2024-09-27 PROCEDURE — 64566 NEUROELTRD STIM POST TIBIAL: CPT

## 2024-10-04 ENCOUNTER — APPOINTMENT (OUTPATIENT)
Dept: UROLOGY | Facility: CLINIC | Age: 54
End: 2024-10-04

## 2024-10-04 PROCEDURE — 64566 NEUROELTRD STIM POST TIBIAL: CPT

## 2024-10-11 ENCOUNTER — APPOINTMENT (OUTPATIENT)
Dept: UROLOGY | Facility: CLINIC | Age: 54
End: 2024-10-11

## 2024-10-11 DIAGNOSIS — R35.0 FREQUENCY OF MICTURITION: ICD-10-CM

## 2024-10-11 DIAGNOSIS — N40.1 BENIGN PROSTATIC HYPERPLASIA WITH LOWER URINARY TRACT SYMPMS: ICD-10-CM

## 2024-10-11 DIAGNOSIS — R39.15 URGENCY OF URINATION: ICD-10-CM

## 2024-10-11 DIAGNOSIS — N13.8 BENIGN PROSTATIC HYPERPLASIA WITH LOWER URINARY TRACT SYMPMS: ICD-10-CM

## 2024-10-11 PROCEDURE — 64566 NEUROELTRD STIM POST TIBIAL: CPT

## 2024-10-18 ENCOUNTER — APPOINTMENT (OUTPATIENT)
Dept: UROLOGY | Facility: CLINIC | Age: 54
End: 2024-10-18

## 2024-10-25 ENCOUNTER — APPOINTMENT (OUTPATIENT)
Dept: UROLOGY | Facility: CLINIC | Age: 54
End: 2024-10-25
Payer: COMMERCIAL

## 2024-10-25 DIAGNOSIS — R35.0 FREQUENCY OF MICTURITION: ICD-10-CM

## 2024-10-25 PROCEDURE — 64566 NEUROELTRD STIM POST TIBIAL: CPT

## 2024-10-25 PROCEDURE — 81003 URINALYSIS AUTO W/O SCOPE: CPT | Mod: QW

## 2024-11-01 ENCOUNTER — APPOINTMENT (OUTPATIENT)
Dept: UROLOGY | Facility: CLINIC | Age: 54
End: 2024-11-01
Payer: COMMERCIAL

## 2024-11-01 PROCEDURE — 64566 NEUROELTRD STIM POST TIBIAL: CPT

## 2024-11-08 ENCOUNTER — APPOINTMENT (OUTPATIENT)
Dept: UROLOGY | Facility: CLINIC | Age: 54
End: 2024-11-08
Payer: COMMERCIAL

## 2024-11-08 DIAGNOSIS — R35.0 FREQUENCY OF MICTURITION: ICD-10-CM

## 2024-11-08 DIAGNOSIS — R39.15 URGENCY OF URINATION: ICD-10-CM

## 2024-11-08 PROCEDURE — 64566 NEUROELTRD STIM POST TIBIAL: CPT

## 2024-12-20 ENCOUNTER — APPOINTMENT (OUTPATIENT)
Dept: UROLOGY | Facility: CLINIC | Age: 54
End: 2024-12-20

## 2024-12-20 PROCEDURE — G2211 COMPLEX E/M VISIT ADD ON: CPT

## 2024-12-20 PROCEDURE — 99214 OFFICE O/P EST MOD 30 MIN: CPT

## 2025-03-24 ENCOUNTER — APPOINTMENT (OUTPATIENT)
Facility: CLINIC | Age: 55
End: 2025-03-24

## 2025-03-25 ENCOUNTER — APPOINTMENT (OUTPATIENT)
Dept: UROLOGY | Facility: CLINIC | Age: 55
End: 2025-03-25